# Patient Record
Sex: FEMALE | Race: WHITE | NOT HISPANIC OR LATINO | Employment: FULL TIME | URBAN - METROPOLITAN AREA
[De-identification: names, ages, dates, MRNs, and addresses within clinical notes are randomized per-mention and may not be internally consistent; named-entity substitution may affect disease eponyms.]

---

## 2024-11-13 ENCOUNTER — PATIENT MESSAGE (OUTPATIENT)
Dept: OTHER | Facility: OTHER | Age: 39
End: 2024-11-13
Payer: MEDICAID

## 2024-11-19 ENCOUNTER — ROUTINE PRENATAL (OUTPATIENT)
Dept: OBSTETRICS AND GYNECOLOGY | Facility: CLINIC | Age: 39
End: 2024-11-19
Payer: MEDICAID

## 2024-11-19 VITALS
SYSTOLIC BLOOD PRESSURE: 114 MMHG | WEIGHT: 147.06 LBS | BODY MASS INDEX: 25.24 KG/M2 | DIASTOLIC BLOOD PRESSURE: 72 MMHG

## 2024-11-19 DIAGNOSIS — Z3A.17 17 WEEKS GESTATION OF PREGNANCY: ICD-10-CM

## 2024-11-19 DIAGNOSIS — Z34.81 ENCOUNTER FOR SUPERVISION OF OTHER NORMAL PREGNANCY IN FIRST TRIMESTER: Primary | ICD-10-CM

## 2024-11-19 LAB
BILIRUBIN, UA POC OHS: NEGATIVE
BLOOD, UA POC OHS: NEGATIVE
CLARITY, UA POC OHS: CLEAR
COLOR, UA POC OHS: YELLOW
GLUCOSE, UA POC OHS: NEGATIVE
KETONES, UA POC OHS: NEGATIVE
LEUKOCYTES, UA POC OHS: NEGATIVE
NITRITE, UA POC OHS: NEGATIVE
PH, UA POC OHS: 7
PROTEIN, UA POC OHS: NEGATIVE
SPECIFIC GRAVITY, UA POC OHS: 1.01
UROBILINOGEN, UA POC OHS: 0.2

## 2024-11-19 PROCEDURE — 87661 TRICHOMONAS VAGINALIS AMPLIF: CPT | Performed by: OBSTETRICS & GYNECOLOGY

## 2024-11-19 PROCEDURE — 87591 N.GONORRHOEAE DNA AMP PROB: CPT | Performed by: OBSTETRICS & GYNECOLOGY

## 2024-11-19 PROCEDURE — 99999 PR PBB SHADOW E&M-EST. PATIENT-LVL II: CPT | Mod: PBBFAC,,, | Performed by: OBSTETRICS & GYNECOLOGY

## 2024-11-19 PROCEDURE — 99999PBSHW POCT URINALYSIS(INSTRUMENT): Mod: PBBFAC,,,

## 2024-11-19 PROCEDURE — 81003 URINALYSIS AUTO W/O SCOPE: CPT | Mod: PBBFAC,PN | Performed by: OBSTETRICS & GYNECOLOGY

## 2024-11-19 PROCEDURE — 99212 OFFICE O/P EST SF 10 MIN: CPT | Mod: PBBFAC,TH,PN | Performed by: OBSTETRICS & GYNECOLOGY

## 2024-11-19 PROCEDURE — 99213 OFFICE O/P EST LOW 20 MIN: CPT | Mod: TH,S$PBB,, | Performed by: OBSTETRICS & GYNECOLOGY
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
## 2024-11-20 LAB
C TRACH DNA SPEC QL NAA+PROBE: NOT DETECTED
N GONORRHOEA DNA SPEC QL NAA+PROBE: NOT DETECTED

## 2024-11-21 LAB
SPECIMEN SOURCE: NORMAL
T VAGINALIS RRNA SPEC QL NAA+PROBE: NEGATIVE

## 2024-11-23 ENCOUNTER — OFFICE VISIT (OUTPATIENT)
Dept: URGENT CARE | Facility: CLINIC | Age: 39
End: 2024-11-23
Payer: MEDICAID

## 2024-11-23 VITALS
BODY MASS INDEX: 24.75 KG/M2 | HEART RATE: 88 BPM | TEMPERATURE: 98 F | OXYGEN SATURATION: 97 % | DIASTOLIC BLOOD PRESSURE: 80 MMHG | WEIGHT: 145 LBS | SYSTOLIC BLOOD PRESSURE: 123 MMHG | HEIGHT: 64 IN

## 2024-11-23 DIAGNOSIS — L50.9 URTICARIA: ICD-10-CM

## 2024-11-23 DIAGNOSIS — L25.9 CONTACT DERMATITIS AND ECZEMA: Primary | ICD-10-CM

## 2024-11-23 DIAGNOSIS — B37.9 CANDIDA ALBICANS INFECTION: ICD-10-CM

## 2024-11-23 PROCEDURE — 99213 OFFICE O/P EST LOW 20 MIN: CPT | Mod: S$GLB,,, | Performed by: PHYSICIAN ASSISTANT

## 2024-11-23 RX ORDER — CLOTRIMAZOLE 1 %
CREAM (GRAM) TOPICAL 2 TIMES DAILY
Qty: 28 G | Refills: 0 | Status: SHIPPED | OUTPATIENT
Start: 2024-11-23

## 2024-11-23 RX ORDER — DIPHENHYDRAMINE HCL 25 MG
25 CAPSULE ORAL
Status: COMPLETED | OUTPATIENT
Start: 2024-11-23 | End: 2024-11-23

## 2024-11-23 RX ORDER — HYDROXYZINE HYDROCHLORIDE 25 MG/1
25 TABLET, FILM COATED ORAL 3 TIMES DAILY
Qty: 15 TABLET | Refills: 0 | Status: SHIPPED | OUTPATIENT
Start: 2024-11-23 | End: 2024-11-28

## 2024-11-23 RX ORDER — FAMOTIDINE 20 MG/1
20 TABLET, FILM COATED ORAL
Status: COMPLETED | OUTPATIENT
Start: 2024-11-23 | End: 2024-11-23

## 2024-11-23 RX ORDER — FAMOTIDINE 20 MG/1
20 TABLET, FILM COATED ORAL 2 TIMES DAILY
Qty: 20 TABLET | Refills: 0 | Status: SHIPPED | OUTPATIENT
Start: 2024-11-23 | End: 2024-12-03

## 2024-11-23 RX ORDER — DIPHENHYDRAMINE HCL 25 MG
25 CAPSULE ORAL EVERY 6 HOURS PRN
Qty: 20 CAPSULE | Refills: 0 | Status: SHIPPED | OUTPATIENT
Start: 2024-11-23 | End: 2024-11-23

## 2024-11-23 RX ADMIN — FAMOTIDINE 20 MG: 20 TABLET, FILM COATED ORAL at 12:11

## 2024-11-23 RX ADMIN — Medication 25 MG: at 12:11

## 2024-11-23 NOTE — PROGRESS NOTES
"Subjective:      Patient ID: Silvia Berry is a 39 y.o. female.    Vitals:  height is 5' 4" (1.626 m) and weight is 65.8 kg (145 lb). Her oral temperature is 98.2 °F (36.8 °C). Her blood pressure is 123/80 and her pulse is 88. Her oxygen saturation is 97%.     Chief Complaint: Urticaria    Pt c/o hives on sides, back of thighs and buttocks occurred 2 days ago. Treatments tried oatmeal baths, calamine lotion with temporary relief. Pain 9/10.    Urticaria  This is a new problem. The current episode started in the past 7 days. The affected locations include the left buttock, right buttock, left upper leg, right upper leg, left arm, right arm and back. The rash is characterized by redness, pain, itchiness, burning and swelling. Pertinent negatives include no congestion, cough, diarrhea, eye pain, fatigue, fever, shortness of breath, sore throat or vomiting. Past treatments include antihistamine, anti-itch cream, cold compress and moisturizer. The treatment provided no relief.       Constitution: Negative for chills, sweating, fatigue and fever.   HENT:  Negative for ear pain, drooling, congestion, sore throat, trouble swallowing and voice change.    Neck: Negative for neck pain, neck stiffness, painful lymph nodes and neck swelling.   Cardiovascular:  Negative for chest pain, leg swelling, palpitations, sob on exertion and passing out.   Eyes:  Negative for eye pain, eye redness, photophobia, double vision, blurred vision and eyelid swelling.   Respiratory:  Negative for chest tightness, cough, sputum production, bloody sputum, shortness of breath, stridor and wheezing.    Gastrointestinal:  Negative for abdominal pain, abdominal bloating, nausea, vomiting, constipation, diarrhea and heartburn.   Musculoskeletal:  Negative for joint pain, joint swelling, abnormal ROM of joint, back pain, muscle cramps and muscle ache.   Skin:  Positive for hives. Negative for rash.   Allergic/Immunologic: Positive for hives and " itching. Negative for seasonal allergies, food allergies and sneezing.   Neurological:  Negative for dizziness, light-headedness, passing out, loss of balance, headaches, altered mental status, loss of consciousness and seizures.   Hematologic/Lymphatic: Negative for swollen lymph nodes.   Psychiatric/Behavioral:  Negative for altered mental status and nervous/anxious. The patient is not nervous/anxious.       Objective:     Physical Exam   Constitutional: She is oriented to person, place, and time. She appears well-developed. She is cooperative.   HENT:   Head: Normocephalic and atraumatic.   Ears:   Right Ear: Hearing, tympanic membrane, external ear and ear canal normal.   Left Ear: Hearing, tympanic membrane, external ear and ear canal normal.   Nose: Nose normal. No mucosal edema or nasal deformity. No epistaxis. Right sinus exhibits no maxillary sinus tenderness and no frontal sinus tenderness. Left sinus exhibits no maxillary sinus tenderness and no frontal sinus tenderness.   Mouth/Throat: Uvula is midline, oropharynx is clear and moist and mucous membranes are normal. No trismus in the jaw. Normal dentition. No uvula swelling.   Eyes: Conjunctivae and lids are normal.   Neck: Trachea normal and phonation normal. Neck supple.   Cardiovascular: Normal rate, regular rhythm, normal heart sounds and normal pulses.   Pulmonary/Chest: Effort normal and breath sounds normal.   Abdominal: Normal appearance and bowel sounds are normal. Soft.   Musculoskeletal: Normal range of motion.         General: Normal range of motion.   Neurological: She is alert and oriented to person, place, and time. She exhibits normal muscle tone.   Skin: Skin is warm, dry, intact and rash.         Comments: +papular rash to bl upper extremities. Torso, abdomen.   Psychiatric: Her speech is normal and behavior is normal. Judgment and thought content normal.   Nursing note and vitals reviewed.      Assessment:     1. Urticaria        Plan:    Pt is pregnant. Has  to take home. Discussed possibilities for urticarial and contact dermatitis rash. Some areas under breasts resemle yeast formation. Pt is 18 weeks pregnant    Urticaria    Other orders  -     famotidine tablet 20 mg  -     diphenhydrAMINE capsule 25 mg  -     famotidine (PEPCID) 20 MG tablet; Take 1 tablet (20 mg total) by mouth 2 (two) times daily. for 10 days  Dispense: 20 tablet; Refill: 0  -     diphenhydrAMINE (BENADRYL) 25 mg capsule; Take 1 capsule (25 mg total) by mouth every 6 (six) hours as needed for Itching.  Dispense: 20 capsule; Refill: 0    INSTRUCTIONS:  - Rest.  - Drink plenty of fluids.  - Take Tylenol as directed as needed for fever/pain.  Do not take more than the recommended dose.  - follow up with your PCP within the next 1-2 weeks as needed.  - You must understand that you have received an Urgent Care treatment only and that you may be released before all of your medical problems are known or treated.   - You, the patient, will arrange for follow up care as instructed.   - If your condition worsens or fails to improve we recommend that you receive another evaluation at the ER immediately or contact your PCP to discuss your concerns.   - You can call [phone] or [phone] to help schedule an appointment with the appropriate provider.     -If you smoke cigarettes, it would be beneficial for you to stop.

## 2024-11-26 ENCOUNTER — TELEPHONE (OUTPATIENT)
Dept: OBSTETRICS AND GYNECOLOGY | Facility: CLINIC | Age: 39
End: 2024-11-26
Payer: MEDICAID

## 2024-11-26 NOTE — TELEPHONE ENCOUNTER
----- Message from Leo sent at 11/26/2024  8:48 AM CST -----  Type:  Needs Medical Advice    Who Called: pt  Symptoms (please be specific): Symptom: Hives  Outcome: Schedule a same-day appointment or talk to a nurse or provider within 1 hour.  Reason: Caller denied all higher acuity questions  How long has patient had these symptoms:    Pharmacy name and phone #:  [facility] #[id number] [facility] [id number] AT [facility] [address]  Would the patient rather a call back or a response via [facility]? call  Best Call Back Number:   [phone]  Additional Information: pt states she would like a call back from office asap to discuss getting an appt/ advice. Pt states she went to urgent care and they prescribed cream but they are not working. Thank you  
Detail Level: Detailed

## 2024-12-04 ENCOUNTER — PATIENT MESSAGE (OUTPATIENT)
Dept: OTHER | Facility: OTHER | Age: 39
End: 2024-12-04
Payer: MEDICAID

## 2024-12-17 ENCOUNTER — ROUTINE PRENATAL (OUTPATIENT)
Dept: OBSTETRICS AND GYNECOLOGY | Facility: CLINIC | Age: 39
End: 2024-12-17
Payer: MEDICAID

## 2024-12-17 ENCOUNTER — HOSPITAL ENCOUNTER (OUTPATIENT)
Dept: RADIOLOGY | Facility: HOSPITAL | Age: 39
Discharge: HOME | End: 2024-12-17
Attending: OBSTETRICS & GYNECOLOGY
Payer: MEDICAID

## 2024-12-17 VITALS
DIASTOLIC BLOOD PRESSURE: 64 MMHG | BODY MASS INDEX: 26.45 KG/M2 | WEIGHT: 154.13 LBS | SYSTOLIC BLOOD PRESSURE: 116 MMHG

## 2024-12-17 DIAGNOSIS — Z34.81 ENCOUNTER FOR SUPERVISION OF OTHER NORMAL PREGNANCY IN FIRST TRIMESTER: Primary | ICD-10-CM

## 2024-12-17 DIAGNOSIS — Z34.81 ENCOUNTER FOR SUPERVISION OF OTHER NORMAL PREGNANCY IN FIRST TRIMESTER: ICD-10-CM

## 2024-12-17 LAB
BILIRUBIN, UA POC OHS: NEGATIVE
BLOOD, UA POC OHS: NEGATIVE
CLARITY, UA POC OHS: CLEAR
COLOR, UA POC OHS: YELLOW
GLUCOSE, UA POC OHS: NEGATIVE
KETONES, UA POC OHS: NEGATIVE
LEUKOCYTES, UA POC OHS: NEGATIVE
NITRITE, UA POC OHS: NEGATIVE
PH, UA POC OHS: 7.5
PROTEIN, UA POC OHS: NEGATIVE
SPECIFIC GRAVITY, UA POC OHS: 1.01
UROBILINOGEN, UA POC OHS: 0.2

## 2024-12-17 PROCEDURE — 76805 OB US >/= 14 WKS SNGL FETUS: CPT | Mod: TC,PN

## 2024-12-17 PROCEDURE — 99212 OFFICE O/P EST SF 10 MIN: CPT | Mod: PBBFAC,25,TH,PN | Performed by: OBSTETRICS & GYNECOLOGY

## 2024-12-17 PROCEDURE — 99999PBSHW POCT URINALYSIS(INSTRUMENT): Mod: PBBFAC,,,

## 2024-12-17 PROCEDURE — 81003 URINALYSIS AUTO W/O SCOPE: CPT | Mod: PBBFAC,PN | Performed by: OBSTETRICS & GYNECOLOGY

## 2024-12-17 PROCEDURE — 76805 OB US >/= 14 WKS SNGL FETUS: CPT | Mod: 26,,, | Performed by: RADIOLOGY

## 2024-12-17 PROCEDURE — 99999 PR PBB SHADOW E&M-EST. PATIENT-LVL II: CPT | Mod: PBBFAC,,, | Performed by: OBSTETRICS & GYNECOLOGY

## 2024-12-17 PROCEDURE — 99213 OFFICE O/P EST LOW 20 MIN: CPT | Mod: TH,S$PBB,, | Performed by: OBSTETRICS & GYNECOLOGY

## 2024-12-17 NOTE — PROGRESS NOTES
Subjective:      Silvia Berry is a 39 y.o. female being seen today for her obstetrical visit. She is at 21w6d gestation. Patient reports no complaints.     OB ROS: no vaginal bleeding, no leakage of fluid, no contractions/cramping, no vaginal discharge. Fetal movement: normal.    Menstrual History:  OB History          2    Para   1    Term   1       0    AB   0    Living   1         SAB   0    IAB   0    Ectopic   0    Multiple   0    Live Births   1                Patient's last menstrual period was 2024 (exact date).       Objective:      /64   Wt 69.9 kg (154 lb 1.6 oz)   LMP 2024 (Exact Date)   BMI 26.45 kg/m²     Vitals  BP: 116/64  Weight: 69.9 kg (154 lb 1.6 oz)  Prenatal  Fetal Heart Rate: 140 US  Movement: Present  Fundal height not measured  6.7 kg (14 lb 12.3 oz)           Prenatal Labs:  Lab Results   Component Value Date    GROUPTRH A NEG 2024    INDCOGEL POS 2021    HGB 14.3 2024    HCT 42.7 2024     2024    RUBELLAIMMUN Reactive 2024    HEPBSAG Non-reactive 2024    [id number] Non-reactive 2024    RPR Non-reactive 2021    LABCHLA Not Detected 2024    LABNGO Not Detected 2024    LABURIN No significant growth 2024    [id number] Negative 2021       Assessment:      Pregnancy 21w6d      Plan:     EDC 2025 by 9wk ultrasound  Vaccines: declined flu, Tdap, & RSV  BMI: 23, recommenced 25-35# gain.   Nausea and vomiting: Rx sent, improved  Cdx1; interested in trial of labor after  delivery  Desires Natural, recommended Lamaze classes &   Genetic: AMA: NIPT low risk, female, aspirin   Pap: 2023 NILM/HPVneg  Rh Neg: [place] Rh testing; RhD not detected, RhoGam not indicated.   Peds: [facility]  Natural  Breastfeeding, needs pump  Contraception: declines        1. Encounter for supervision of other normal pregnancy in first trimester  -     POCT  Urinalysis(Instrument)  -     OB Glucose Screen; Future; Expected date: 12/17/2024         Follow up in 4 weeks.       I reviewed today her blood pressure, weight gain, urine results and  fetal heart rate.  I have reviewed the previous labs and ultrasound with the patient.   I have answered questions to her satisfaction and total of 20 minutes spend today

## 2025-01-01 ENCOUNTER — PATIENT MESSAGE (OUTPATIENT)
Dept: OTHER | Facility: OTHER | Age: 40
End: 2025-01-01
Payer: MEDICAID

## 2025-01-14 ENCOUNTER — ROUTINE PRENATAL (OUTPATIENT)
Dept: OBSTETRICS AND GYNECOLOGY | Facility: CLINIC | Age: 40
End: 2025-01-14
Payer: MEDICAID

## 2025-01-14 ENCOUNTER — LAB VISIT (OUTPATIENT)
Dept: LAB | Facility: HOSPITAL | Age: 40
End: 2025-01-14
Attending: OBSTETRICS & GYNECOLOGY
Payer: MEDICAID

## 2025-01-14 VITALS
DIASTOLIC BLOOD PRESSURE: 60 MMHG | BODY MASS INDEX: 27.97 KG/M2 | SYSTOLIC BLOOD PRESSURE: 118 MMHG | WEIGHT: 162.94 LBS

## 2025-01-14 DIAGNOSIS — Z34.81 ENCOUNTER FOR SUPERVISION OF OTHER NORMAL PREGNANCY IN FIRST TRIMESTER: Primary | ICD-10-CM

## 2025-01-14 DIAGNOSIS — Z34.81 ENCOUNTER FOR SUPERVISION OF OTHER NORMAL PREGNANCY IN FIRST TRIMESTER: ICD-10-CM

## 2025-01-14 LAB
BILIRUBIN, UA POC OHS: NEGATIVE
BLOOD, UA POC OHS: NEGATIVE
CLARITY, UA POC OHS: CLEAR
COLOR, UA POC OHS: YELLOW
GLUCOSE SERPL-MCNC: 120 MG/DL (ref 70–140)
GLUCOSE, UA POC OHS: NEGATIVE
KETONES, UA POC OHS: NEGATIVE
LEUKOCYTES, UA POC OHS: NEGATIVE
NITRITE, UA POC OHS: NEGATIVE
PH, UA POC OHS: 7.5
PROTEIN, UA POC OHS: NEGATIVE
SPECIFIC GRAVITY, UA POC OHS: 1.01
UROBILINOGEN, UA POC OHS: 0.2

## 2025-01-14 PROCEDURE — 82950 GLUCOSE TEST: CPT | Performed by: OBSTETRICS & GYNECOLOGY

## 2025-01-14 PROCEDURE — 36415 COLL VENOUS BLD VENIPUNCTURE: CPT | Mod: PN | Performed by: OBSTETRICS & GYNECOLOGY

## 2025-01-14 PROCEDURE — 99999 PR PBB SHADOW E&M-EST. PATIENT-LVL II: CPT | Mod: PBBFAC,,, | Performed by: OBSTETRICS & GYNECOLOGY

## 2025-01-14 PROCEDURE — 99212 OFFICE O/P EST SF 10 MIN: CPT | Mod: PBBFAC,TH,PN | Performed by: OBSTETRICS & GYNECOLOGY

## 2025-01-14 PROCEDURE — 81003 URINALYSIS AUTO W/O SCOPE: CPT | Mod: PBBFAC,PN | Performed by: OBSTETRICS & GYNECOLOGY

## 2025-01-14 PROCEDURE — 99213 OFFICE O/P EST LOW 20 MIN: CPT | Mod: TH,S$PBB,, | Performed by: OBSTETRICS & GYNECOLOGY

## 2025-01-14 PROCEDURE — 99999PBSHW POCT URINALYSIS(INSTRUMENT): Mod: PBBFAC,,,

## 2025-01-14 NOTE — PROGRESS NOTES
Subjective:      Silvia Berry is a 39 y.o. female being seen today for her obstetrical visit. She is at 25w6d gestation. Patient reports no complaints.     OB ROS: no vaginal bleeding, no leakage of fluid, no contractions/cramping, no vaginal discharge. Fetal movement: normal.    Menstrual History:  OB History          2    Para   1    Term   1       0    AB   0    Living   1         SAB   0    IAB   0    Ectopic   0    Multiple   0    Live Births   1                Patient's last menstrual period was 2024 (exact date).       Objective:      /60   Wt 73.9 kg (162 lb 14.7 oz)   LMP 2024 (Exact Date)   BMI 27.97 kg/m²     Vitals  BP: 118/60  Weight: 73.9 kg (162 lb 14.7 oz)  Prenatal  Fetal Heart Rate: 145  Movement: Present  Fundal height not measured  10.7 kg (23 lb 9.4 oz)           Prenatal Labs:  Lab Results   Component Value Date    GROUPTRH A NEG 2024    INDCOGEL POS 2021    HGB 14.3 2024    HCT 42.7 2024     2024    RUBELLAIMMUN Reactive 2024    HEPBSAG Non-reactive 2024    [id number] Non-reactive 2024    RPR Non-reactive 2021    LABCHLA Not Detected 2024    LABNGO Not Detected 2024    LABURIN No significant growth 2024    [id number] Negative 2021       Assessment:      Pregnancy 25w6d      Plan:     EDC 2025 by 9wk ultrasound  Vaccines: declined flu, Tdap, & RSV  BMI: 23, recommenced 25-35# gain.   Nausea and vomiting: Rx sent, improved  Cdx1; interested in trial of labor after  delivery. Aware we cannot promise a .   Desires Natural, recommended Lamaze classes &   Genetic: AMA: NIPT low risk, female, aspirin   Pap: 2023 NILM/HPVneg  Rh Neg: [place] Rh testing; RhD not detected, RhoGam not indicated.   Peds: [facility]  Natural  Breastfeeding, needs pump  Contraception: declines        1. Encounter for supervision of other normal pregnancy in first  trimester  -     POCT Urinalysis(Instrument)         Follow up in 4 weeks.       I reviewed today her blood pressure, weight gain, urine results and  fetal heart rate.  I have reviewed the previous labs and ultrasound with the patient.   I have answered questions to her satisfaction and total of 20 minutes spend today

## 2025-01-15 ENCOUNTER — PATIENT MESSAGE (OUTPATIENT)
Dept: OTHER | Facility: OTHER | Age: 40
End: 2025-01-15
Payer: MEDICAID

## 2025-01-16 ENCOUNTER — PATIENT MESSAGE (OUTPATIENT)
Dept: OBSTETRICS AND GYNECOLOGY | Facility: CLINIC | Age: 40
End: 2025-01-16
Payer: MEDICAID

## 2025-01-29 ENCOUNTER — PATIENT MESSAGE (OUTPATIENT)
Dept: OTHER | Facility: OTHER | Age: 40
End: 2025-01-29
Payer: MEDICAID

## 2025-02-12 ENCOUNTER — PATIENT MESSAGE (OUTPATIENT)
Dept: OTHER | Facility: OTHER | Age: 40
End: 2025-02-12
Payer: MEDICAID

## 2025-02-19 ENCOUNTER — LAB VISIT (OUTPATIENT)
Dept: LAB | Facility: HOSPITAL | Age: 40
End: 2025-02-19
Attending: OBSTETRICS & GYNECOLOGY
Payer: MEDICAID

## 2025-02-19 ENCOUNTER — RESULTS FOLLOW-UP (OUTPATIENT)
Dept: OBSTETRICS AND GYNECOLOGY | Facility: CLINIC | Age: 40
End: 2025-02-19

## 2025-02-19 ENCOUNTER — ROUTINE PRENATAL (OUTPATIENT)
Dept: OBSTETRICS AND GYNECOLOGY | Facility: CLINIC | Age: 40
End: 2025-02-19
Payer: MEDICAID

## 2025-02-19 VITALS
BODY MASS INDEX: 28.34 KG/M2 | SYSTOLIC BLOOD PRESSURE: 116 MMHG | WEIGHT: 165.13 LBS | DIASTOLIC BLOOD PRESSURE: 78 MMHG

## 2025-02-19 DIAGNOSIS — O34.211 MATERNAL CARE DUE TO LOW TRANSVERSE UTERINE SCAR FROM PREVIOUS CESAREAN DELIVERY: ICD-10-CM

## 2025-02-19 DIAGNOSIS — O09.529 ANTEPARTUM MULTIGRAVIDA OF ADVANCED MATERNAL AGE: ICD-10-CM

## 2025-02-19 DIAGNOSIS — Z34.81 ENCOUNTER FOR SUPERVISION OF OTHER NORMAL PREGNANCY IN FIRST TRIMESTER: Primary | ICD-10-CM

## 2025-02-19 DIAGNOSIS — Z34.81 ENCOUNTER FOR SUPERVISION OF OTHER NORMAL PREGNANCY IN FIRST TRIMESTER: ICD-10-CM

## 2025-02-19 LAB
BASOPHILS # BLD AUTO: 0.04 K/UL (ref 0–0.2)
BASOPHILS NFR BLD: 0.4 % (ref 0–1.9)
BILIRUBIN, UA POC OHS: NEGATIVE
BLOOD, UA POC OHS: NEGATIVE
CLARITY, UA POC OHS: CLEAR
COLOR, UA POC OHS: YELLOW
DIFFERENTIAL METHOD BLD: ABNORMAL
EOSINOPHIL # BLD AUTO: 0.2 K/UL (ref 0–0.5)
EOSINOPHIL NFR BLD: 1.8 % (ref 0–8)
ERYTHROCYTE [DISTWIDTH] IN BLOOD BY AUTOMATED COUNT: 13.8 % (ref 11.5–14.5)
GLUCOSE, UA POC OHS: NEGATIVE
HCT VFR BLD AUTO: 37.3 % (ref 37–48.5)
HGB BLD-MCNC: 11.8 G/DL (ref 12–16)
IMM GRANULOCYTES # BLD AUTO: 0.04 K/UL (ref 0–0.04)
IMM GRANULOCYTES NFR BLD AUTO: 0.4 % (ref 0–0.5)
KETONES, UA POC OHS: NEGATIVE
LEUKOCYTES, UA POC OHS: NEGATIVE
LYMPHOCYTES # BLD AUTO: 2 K/UL (ref 1–4.8)
LYMPHOCYTES NFR BLD: 22.6 % (ref 18–48)
MCH RBC QN AUTO: 30.5 PG (ref 27–31)
MCHC RBC AUTO-ENTMCNC: 31.6 G/DL (ref 32–36)
MCV RBC AUTO: 96 FL (ref 82–98)
MONOCYTES # BLD AUTO: 0.8 K/UL (ref 0.3–1)
MONOCYTES NFR BLD: 8.5 % (ref 4–15)
NEUTROPHILS # BLD AUTO: 6 K/UL (ref 1.8–7.7)
NEUTROPHILS NFR BLD: 66.3 % (ref 38–73)
NITRITE, UA POC OHS: NEGATIVE
NRBC BLD-RTO: 0 /100 WBC
PH, UA POC OHS: 7
PLATELET # BLD AUTO: 312 K/UL (ref 150–450)
PMV BLD AUTO: 11.3 FL (ref 9.2–12.9)
PROTEIN, UA POC OHS: NEGATIVE
RBC # BLD AUTO: 3.87 M/UL (ref 4–5.4)
SPECIFIC GRAVITY, UA POC OHS: 1.02
UROBILINOGEN, UA POC OHS: 0.2
WBC # BLD AUTO: 9.02 K/UL (ref 3.9–12.7)

## 2025-02-19 PROCEDURE — 36415 COLL VENOUS BLD VENIPUNCTURE: CPT | Mod: PO | Performed by: OBSTETRICS & GYNECOLOGY

## 2025-02-19 PROCEDURE — 81003 URINALYSIS AUTO W/O SCOPE: CPT | Mod: PBBFAC,PN | Performed by: OBSTETRICS & GYNECOLOGY

## 2025-02-19 PROCEDURE — 85025 COMPLETE CBC W/AUTO DIFF WBC: CPT | Performed by: OBSTETRICS & GYNECOLOGY

## 2025-02-19 PROCEDURE — 99212 OFFICE O/P EST SF 10 MIN: CPT | Mod: PBBFAC,TH,PN | Performed by: OBSTETRICS & GYNECOLOGY

## 2025-02-19 NOTE — PROGRESS NOTES
Subjective:      Silvia Berry is a 39 y.o. female being seen today for her obstetrical visit. She is at 31w0d gestation. Patient reports no complaints.     OB ROS: no vaginal bleeding, no leakage of fluid, no contractions/cramping, no vaginal discharge. Fetal movement: normal.    Menstrual History:  OB History          2    Para   1    Term   1       0    AB   0    Living   1         SAB   0    IAB   0    Ectopic   0    Multiple   0    Live Births   1                Patient's last menstrual period was 2024 (exact date).       Objective:      /78   Wt 74.9 kg (165 lb 2 oz)   LMP 2024 (Exact Date)   BMI 28.34 kg/m²     Vitals  BP: 116/78  Weight: 74.9 kg (165 lb 2 oz)  Prenatal  Fetal Heart Rate: 148  Movement: Present  Fundal height size equal to dates  11.7 kg (25 lb 12.7 oz)    Prenatal Labs:  Lab Results   Component Value Date    GROUPTRH A NEG 2024    INDCOGEL POS 2021    HGB 14.3 2024    HCT 42.7 2024     2024    RUBELLAIMMUN Reactive 2024    HEPBSAG Non-reactive 2024    [id number] Non-reactive 2024    RPR Non-reactive 2021    LABCHLA Not Detected 2024    LABNGO Not Detected 2024    LABURIN No significant growth 2024    OBGLUCOSESCR 120 2025    [id number] Negative 2021       Assessment:      Pregnancy 31w0d      Plan:     EDC 2025 by 9wk ultrasound  Vaccines: declined flu, Tdap, & RSV  BMI: 23, recommenced 25-35# gain.   Nausea and vomiting: Rx sent, improved  Cdx1; interested in trial of labor after  delivery. Aware we cannot promise a .   Desires Natural, recommended Lamaze classes &   Genetic: AMA: NIPT low risk, female, aspirin   Pap: 2023 NILM/HPVneg  Rh Neg: [place] Rh testing; RhD not detected, RhoGam not indicated.   Peds: [facility]  Natural  Breastfeeding, needs pump  Contraception: declines        1. Encounter for supervision of other normal  pregnancy in first trimester  -     CBC Auto Differential; Future; Expected date: 2025    2. Antepartum multigravida of advanced maternal age    3. Maternal care due to low transverse uterine scar from previous  delivery         Follow up in 2 weeks.       I reviewed today her blood pressure, weight gain, urine results and  fetal heart rate.  I have reviewed the previous labs and ultrasound with the patient.   I have answered questions to her satisfaction and total of 20 minutes spend today

## 2025-02-19 NOTE — Clinical Note
Please make all her OB FU visits until delivery. She will need a visit every two weeks until 36 weeks. Then a visit weekly until 40 weeks. She will need a growth US at 36 weeks.   Mornings, after 9AM. 9-10 better. Any day.

## 2025-02-26 ENCOUNTER — PATIENT MESSAGE (OUTPATIENT)
Dept: OTHER | Facility: OTHER | Age: 40
End: 2025-02-26
Payer: MEDICAID

## 2025-03-11 ENCOUNTER — ROUTINE PRENATAL (OUTPATIENT)
Dept: OBSTETRICS AND GYNECOLOGY | Facility: CLINIC | Age: 40
End: 2025-03-11
Payer: MEDICAID

## 2025-03-11 ENCOUNTER — RESULTS FOLLOW-UP (OUTPATIENT)
Dept: OBSTETRICS AND GYNECOLOGY | Facility: CLINIC | Age: 40
End: 2025-03-11

## 2025-03-11 VITALS
DIASTOLIC BLOOD PRESSURE: 70 MMHG | WEIGHT: 168.88 LBS | SYSTOLIC BLOOD PRESSURE: 110 MMHG | BODY MASS INDEX: 28.99 KG/M2

## 2025-03-11 DIAGNOSIS — O09.529 ANTEPARTUM MULTIGRAVIDA OF ADVANCED MATERNAL AGE: Primary | ICD-10-CM

## 2025-03-11 DIAGNOSIS — K21.9 GASTROESOPHAGEAL REFLUX DISEASE, UNSPECIFIED WHETHER ESOPHAGITIS PRESENT: ICD-10-CM

## 2025-03-11 LAB
BILIRUBIN, UA POC OHS: NEGATIVE
BLOOD, UA POC OHS: NEGATIVE
CLARITY, UA POC OHS: CLEAR
COLOR, UA POC OHS: YELLOW
GLUCOSE, UA POC OHS: NEGATIVE
KETONES, UA POC OHS: NEGATIVE
LEUKOCYTES, UA POC OHS: NEGATIVE
NITRITE, UA POC OHS: NEGATIVE
PH, UA POC OHS: 6.5
PROTEIN, UA POC OHS: ABNORMAL
SPECIFIC GRAVITY, UA POC OHS: 1.02
UROBILINOGEN, UA POC OHS: 0.2

## 2025-03-11 PROCEDURE — 81003 URINALYSIS AUTO W/O SCOPE: CPT | Mod: PBBFAC,PN | Performed by: OBSTETRICS & GYNECOLOGY

## 2025-03-11 PROCEDURE — 99999PBSHW POCT URINALYSIS(INSTRUMENT): Mod: PBBFAC,,,

## 2025-03-11 PROCEDURE — 99214 OFFICE O/P EST MOD 30 MIN: CPT | Mod: TH,S$PBB,, | Performed by: OBSTETRICS & GYNECOLOGY

## 2025-03-11 PROCEDURE — 99999 PR PBB SHADOW E&M-EST. PATIENT-LVL III: CPT | Mod: PBBFAC,,, | Performed by: OBSTETRICS & GYNECOLOGY

## 2025-03-11 PROCEDURE — 99213 OFFICE O/P EST LOW 20 MIN: CPT | Mod: PBBFAC,TH,PN | Performed by: OBSTETRICS & GYNECOLOGY

## 2025-03-11 RX ORDER — OMEPRAZOLE 20 MG/1
20 CAPSULE, DELAYED RELEASE ORAL DAILY
Qty: 30 CAPSULE | Refills: 11 | Status: SHIPPED | OUTPATIENT
Start: 2025-03-11 | End: 2026-03-11

## 2025-03-11 NOTE — PROGRESS NOTES
Subjective:      Silvia Berry is a 39 y.o. female being seen today for her obstetrical visit. She is at 33w6d gestation. Patient reports no complaints.     OB ROS: no vaginal bleeding, no leakage of fluid, no contractions/cramping, no vaginal discharge. Fetal movement: normal.    Menstrual History:  OB History          2    Para   1    Term   1       0    AB   0    Living   1         SAB   0    IAB   0    Ectopic   0    Multiple   0    Live Births   1                Patient's last menstrual period was 2024 (exact date).       Objective:      /70   Wt 76.6 kg (168 lb 14 oz)   LMP 2024 (Exact Date)   BMI 28.99 kg/m²     Vitals  BP: 110/70  Weight: 76.6 kg (168 lb 14 oz)  Prenatal  Fundal Height (cm): 36 cm  Fetal Heart Rate: 142  Movement: Present  Fundal height size equal to dates  13.4 kg (29 lb 8.7 oz)           Prenatal Labs:  Lab Results   Component Value Date    GROUPTRH A NEG 2024    INDCOGEL POS 2021    HGB 11.8 (L) 2025    HCT 37.3 2025     2025    RUBELLAIMMUN Reactive 2024    HEPBSAG Non-reactive 2024    [id number] Non-reactive 2024    RPR Non-reactive 2021    LABCHLA Not Detected 2024    LABNGO Not Detected 2024    LABURIN No significant growth 2024    OBGLUCOSESCR 120 2025    [id number] Negative 2021       Assessment:      Pregnancy 33w6d      Plan:     EDC 2025 by 9wk ultrasound  Vaccines: declined flu, Tdap, & RSV  BMI: 23, recommenced 25-35# gain    Cdx1; interested in trial of labor after  delivery. Aware we cannot promise a .   Discussed risks of uterine rupture, patient aware. Discussed repeat  delivery at 39 weeks, patient declined.  Desires Natural, recommended Lamaze classes &   Dicussed risk of need for emergent  delivery under general anesthesia, patient aware.   Discussed indication for pitocin in labor vs postpartum to  prevent postpartum hemorrhage, patient aware.  Natural, Delayed cord clamping  Discussed placenta policy    Genetic: AMA: NIPT low risk, female, aspirin   Pap: 12/2023 NILM/HPVneg  Rh Neg: [place] Rh testing; RhD not detected, RhoGam not indicated.   Peds: [facility]  Breastfeeding, has pump  Contraception: declines          1. Antepartum multigravida of advanced maternal age  -     POCT Urinalysis(Instrument)  -     US OB Limited 1 Or More Gestations; Future; Expected date: 03/25/2025    2. Gastroesophageal reflux disease, unspecified whether esophagitis present  -     omeprazole (PRILOSEC) 20 MG capsule; Take 1 capsule (20 mg total) by mouth once daily.  Dispense: 30 capsule; Refill: 11       Follow up in 2 weeks.     I reviewed today her blood pressure, weight gain, urine results and  fetal heart rate.  I have reviewed the previous labs and ultrasound with the patient.   I have answered questions to her satisfaction and total of 20 minutes spend today

## 2025-03-19 ENCOUNTER — PATIENT MESSAGE (OUTPATIENT)
Dept: OTHER | Facility: OTHER | Age: 40
End: 2025-03-19
Payer: MEDICAID

## 2025-03-24 ENCOUNTER — HOSPITAL ENCOUNTER (OUTPATIENT)
Dept: RADIOLOGY | Facility: HOSPITAL | Age: 40
Discharge: HOME | End: 2025-03-24
Attending: OBSTETRICS & GYNECOLOGY
Payer: MEDICAID

## 2025-03-24 DIAGNOSIS — O09.529 ANTEPARTUM MULTIGRAVIDA OF ADVANCED MATERNAL AGE: ICD-10-CM

## 2025-03-24 PROCEDURE — 76816 OB US FOLLOW-UP PER FETUS: CPT | Mod: TC,PN

## 2025-03-24 PROCEDURE — 76816 OB US FOLLOW-UP PER FETUS: CPT | Mod: 26,,, | Performed by: RADIOLOGY

## 2025-03-25 ENCOUNTER — PATIENT MESSAGE (OUTPATIENT)
Dept: MATERNAL FETAL MEDICINE | Facility: CLINIC | Age: 40
End: 2025-03-25
Payer: MEDICAID

## 2025-03-25 ENCOUNTER — ROUTINE PRENATAL (OUTPATIENT)
Dept: OBSTETRICS AND GYNECOLOGY | Facility: CLINIC | Age: 40
End: 2025-03-25
Payer: MEDICAID

## 2025-03-25 ENCOUNTER — RESULTS FOLLOW-UP (OUTPATIENT)
Dept: OBSTETRICS AND GYNECOLOGY | Facility: CLINIC | Age: 40
End: 2025-03-25

## 2025-03-25 VITALS
WEIGHT: 169.06 LBS | DIASTOLIC BLOOD PRESSURE: 78 MMHG | BODY MASS INDEX: 29.02 KG/M2 | SYSTOLIC BLOOD PRESSURE: 118 MMHG

## 2025-03-25 DIAGNOSIS — O28.3 ABNORMAL FETAL ULTRASOUND: ICD-10-CM

## 2025-03-25 DIAGNOSIS — O36.63X0 MACROSOMIA OF FETUS AFFECTING MANAGEMENT OF MOTHER IN THIRD TRIMESTER, SINGLE OR UNSPECIFIED FETUS: Primary | ICD-10-CM

## 2025-03-25 DIAGNOSIS — O40.9XX0 POLYHYDRAMNIOS AFFECTING PREGNANCY: ICD-10-CM

## 2025-03-25 DIAGNOSIS — O09.91 ENCOUNTER FOR SUPERVISION OF HIGH RISK PREGNANCY IN FIRST TRIMESTER, ANTEPARTUM: Primary | ICD-10-CM

## 2025-03-25 DIAGNOSIS — O36.61X0 MACROSOMIA OF FETUS AFFECTING MANAGEMENT OF MOTHER IN FIRST TRIMESTER, SINGLE OR UNSPECIFIED FETUS: ICD-10-CM

## 2025-03-25 DIAGNOSIS — O09.529 ANTEPARTUM MULTIGRAVIDA OF ADVANCED MATERNAL AGE: ICD-10-CM

## 2025-03-25 DIAGNOSIS — O34.211 MATERNAL CARE DUE TO LOW TRANSVERSE UTERINE SCAR FROM PREVIOUS CESAREAN DELIVERY: ICD-10-CM

## 2025-03-25 PROCEDURE — 87653 STREP B DNA AMP PROBE: CPT | Performed by: OBSTETRICS & GYNECOLOGY

## 2025-03-25 PROCEDURE — 99999 PR PBB SHADOW E&M-EST. PATIENT-LVL III: CPT | Mod: PBBFAC,,, | Performed by: OBSTETRICS & GYNECOLOGY

## 2025-03-25 PROCEDURE — 99999PBSHW POCT URINALYSIS(INSTRUMENT): Mod: PBBFAC,,,

## 2025-03-25 PROCEDURE — 99214 OFFICE O/P EST MOD 30 MIN: CPT | Mod: TH,S$PBB,, | Performed by: OBSTETRICS & GYNECOLOGY

## 2025-03-25 PROCEDURE — 99213 OFFICE O/P EST LOW 20 MIN: CPT | Mod: PBBFAC,TH,PN | Performed by: OBSTETRICS & GYNECOLOGY

## 2025-03-25 PROCEDURE — 81003 URINALYSIS AUTO W/O SCOPE: CPT | Mod: PBBFAC,PN | Performed by: OBSTETRICS & GYNECOLOGY

## 2025-03-25 NOTE — PROGRESS NOTES
Subjective:      Silvia Berry is a 39 y.o. female being seen today for her obstetrical visit. She is at 35w6d gestation. Patient reports no complaints.     OB ROS: no vaginal bleeding, no leakage of fluid, no contractions/cramping, no vaginal discharge. Fetal movement: normal.    Menstrual History:  OB History          2    Para   1    Term   1       0    AB   0    Living   1         SAB   0    IAB   0    Ectopic   0    Multiple   0    Live Births   1                Patient's last menstrual period was 2024 (exact date).       Objective:      /78   Wt 76.7 kg (169 lb 1.5 oz)   LMP 2024 (Exact Date)   BMI 29.02 kg/m²     Vitals  BP: 118/78  Weight: 76.7 kg (169 lb 1.5 oz)  Prenatal  Fetal Heart Rate: 138  Movement: Present  Dilation/Effacement/Station  Dilation: Closed  Fundal height size greater than dates  13.5 kg (29 lb 12.2 oz)           Prenatal Labs:  Lab Results   Component Value Date    GROUPTRH A NEG 2024    INDCOGEL POS 2021    HGB 11.8 (L) 2025    HCT 37.3 2025     2025    RUBELLAIMMUN Reactive 2024    HEPBSAG Non-reactive 2024    [id number] Non-reactive 2024    RPR Non-reactive 2021    LABCHLA Not Detected 2024    LABNGO Not Detected 2024    LABURIN No significant growth 2024    OBGLUCOSESCR 120 2025    [id number] Negative 2021       Assessment:      Pregnancy 35w6d      Plan:     EDC 2025 by 9wk ultrasound  Vaccines: declined flu, Tdap, & RSV  BMI: 23, recommenced 25-35# gain    Cdx1; interested in trial of labor after  delivery. Aware we cannot promise a .   Polyhydramnios: TITO 24.4, repeat ultrasound weekly. Offered 3hr GTT, declined.   Suspected macrosomia: Discussed macrosomia. Discussed limitations of ultrasound in the third trimester. Discussed risk of shoulder dystocia, vaginal laceration, fetal injury or even death. Offered repeat 3hr GTT,  patient declined. Recommend [facility] referral for repeat Growth US. Advised if suspected greated that 5000g at birth, I recommend a  delivery. Discussed complexity of macrosomia, polyhydramnios, and history of prior  delivery. I recommended a repeat  delivery, patient desires a trial of labor after  delivery. Discussed induction of labor with pit or mechanical ripening balloon, she is currently closed.   Desires Natural, recommended Lamaze classes & . Discussed risk of needed general anesthesia or spinal at time of delivery. Discussed placenta policy    Genetic: AMA: NIPT low risk, female, aspirin   Pap: 2023 NILM/HPVneg  Rh Neg: [place] Rh testing; RhD not detected, RhoGam not indicated.   Peds: [facility]  Natural, Delayed cord clamping  Breastfeeding, has pump  Contraception: declines          1. Encounter for supervision of high risk pregnancy in first trimester, antepartum    2. Antepartum multigravida of advanced maternal age  -     POCT Urinalysis(Instrument)  -     Group B Streptococcus, PCR  -     Ambulatory referral/consult to Perinatology; Future; Expected date: 2025    3. Abnormal fetal ultrasound  -     Ambulatory referral/consult to Perinatology; Future; Expected date: 2025    4. Macrosomia of fetus affecting management of mother in first trimester, single or unspecified fetus  -     Ambulatory referral/consult to Perinatology; Future; Expected date: 2025    5. Maternal care due to low transverse uterine scar from previous  delivery  -     Ambulatory referral/consult to Perinatology; Future; Expected date: 2025    6. Polyhydramnios affecting pregnancy  -     Ambulatory referral/consult to Perinatology; Future; Expected date: 2025         Follow up in 1 weeks.       I reviewed today her blood pressure, weight gain, urine results and  fetal heart rate.  I have reviewed the previous labs and ultrasound with the patient.   I have answered  questions to her satisfaction and total of 20 minutes spend today

## 2025-03-28 LAB — GROUP B STREPTOCOCCUS, PCR (OHS): NEGATIVE

## 2025-03-31 ENCOUNTER — PROCEDURE VISIT (OUTPATIENT)
Dept: MATERNAL FETAL MEDICINE | Facility: CLINIC | Age: 40
End: 2025-03-31
Payer: MEDICAID

## 2025-03-31 DIAGNOSIS — O36.63X0 MACROSOMIA OF FETUS AFFECTING MANAGEMENT OF MOTHER IN THIRD TRIMESTER, SINGLE OR UNSPECIFIED FETUS: ICD-10-CM

## 2025-03-31 PROCEDURE — 76811 OB US DETAILED SNGL FETUS: CPT | Mod: PBBFAC,PN | Performed by: OBSTETRICS & GYNECOLOGY

## 2025-04-01 ENCOUNTER — RESULTS FOLLOW-UP (OUTPATIENT)
Dept: OBSTETRICS AND GYNECOLOGY | Facility: CLINIC | Age: 40
End: 2025-04-01

## 2025-04-01 ENCOUNTER — ROUTINE PRENATAL (OUTPATIENT)
Dept: OBSTETRICS AND GYNECOLOGY | Facility: CLINIC | Age: 40
End: 2025-04-01
Payer: MEDICAID

## 2025-04-01 VITALS
SYSTOLIC BLOOD PRESSURE: 124 MMHG | BODY MASS INDEX: 29.59 KG/M2 | DIASTOLIC BLOOD PRESSURE: 82 MMHG | WEIGHT: 172.38 LBS

## 2025-04-01 DIAGNOSIS — O28.3 ABNORMAL FETAL ULTRASOUND: ICD-10-CM

## 2025-04-01 DIAGNOSIS — O09.91 ENCOUNTER FOR SUPERVISION OF HIGH RISK PREGNANCY IN FIRST TRIMESTER, ANTEPARTUM: ICD-10-CM

## 2025-04-01 DIAGNOSIS — O09.529 ANTEPARTUM MULTIGRAVIDA OF ADVANCED MATERNAL AGE: Primary | ICD-10-CM

## 2025-04-01 DIAGNOSIS — O36.61X0 MACROSOMIA OF FETUS AFFECTING MANAGEMENT OF MOTHER IN FIRST TRIMESTER, SINGLE OR UNSPECIFIED FETUS: ICD-10-CM

## 2025-04-01 PROCEDURE — 81003 URINALYSIS AUTO W/O SCOPE: CPT | Mod: PBBFAC,PN | Performed by: OBSTETRICS & GYNECOLOGY

## 2025-04-01 PROCEDURE — 99999PBSHW POCT URINALYSIS(INSTRUMENT): Mod: PBBFAC,,,

## 2025-04-01 PROCEDURE — 99999 PR PBB SHADOW E&M-EST. PATIENT-LVL II: CPT | Mod: PBBFAC,,, | Performed by: OBSTETRICS & GYNECOLOGY

## 2025-04-01 PROCEDURE — 99214 OFFICE O/P EST MOD 30 MIN: CPT | Mod: TH,S$PBB,, | Performed by: OBSTETRICS & GYNECOLOGY

## 2025-04-01 PROCEDURE — 99212 OFFICE O/P EST SF 10 MIN: CPT | Mod: PBBFAC,TH,PN | Performed by: OBSTETRICS & GYNECOLOGY

## 2025-04-01 NOTE — PROGRESS NOTES
Subjective:      Silvia Berry is a 39 y.o. female being seen today for her obstetrical visit. She is at 36w6d gestation. Patient reports no complaints.     OB ROS: no vaginal bleeding, no leakage of fluid, no contractions/cramping, no vaginal discharge. Fetal movement: normal.    Menstrual History:  OB History          2    Para   1    Term   1       0    AB   0    Living   1         SAB   0    IAB   0    Ectopic   0    Multiple   0    Live Births   1                Patient's last menstrual period was 2024 (exact date).       Objective:      /82   Wt 78.2 kg (172 lb 6.4 oz)   LMP 2024 (Exact Date)   BMI 29.59 kg/m²     Vitals  BP: 124/82  Weight: 78.2 kg (172 lb 6.4 oz)  Prenatal  Fetal Heart Rate: 140  Movement: Present  Fundal height size greater than dates  15 kg (33 lb 1.1 oz)           Prenatal Labs:  Lab Results   Component Value Date    GROUPTRH A NEG 2024    INDCOGEL POS 2021    HGB 11.8 (L) 2025    HCT 37.3 2025     2025    RUBELLAIMMUN Reactive 2024    HEPBSAG Non-reactive 2024    [id number] Non-reactive 2024    RPR Non-reactive 2021    LABCHLA Not Detected 2024    LABNGO Not Detected 2024    LABURIN No significant growth 2024    OBGLUCOSESCR 120 2025    [id number] Negative 2021       Assessment:      Pregnancy 36w6d      Plan:     EDC 2025 by 9wk ultrasound  Vaccines: declined flu, Tdap, & RSV  BMI: 23, recommenced 25-35# gain     Cdx1; interested in trial of labor after  delivery. Aware we cannot promise a .   Polyhydramnios: TITO 30, repeat BPP ultrasound weekly. Offered 3hr GTT, declined. Delivery 39 0/7 - 40 6/7.   At last visit suspected macrosomia: MFM growth ultrasound with in normal limits.   Breech presentation on MFM ultrasound, cephalic last week. Dicussed virable presentation due to poly and risk of cord prolapse. Dicussed ECV and risks,  patient declined.   I recommended a repeat  delivery at 39 weeks due to breech presentation, repeat low transverse  delivery schedule for . Patient desires a trial of labor after  delivery if found to be cephalic at the time of delivery. Discussed induction vs augmentation of labor with pit or mechanical ripening balloon, she is currently closed.   Desires Natural, recommended Lamaze classes & . Discussed risk of needed general anesthesia or spinal at time of delivery. Discussed placenta policy     Genetic: AMA: NIPT low risk, female, aspirin   Pap: 2023 NILM/HPVneg  Rh Neg: [place] Rh testing; RhD not detected, RhoGam not indicated.   Peds: [facility]  Natural, Delayed cord clamping  Breastfeeding, has pump  Contraception: declines        1. Antepartum multigravida of advanced maternal age  -     POCT Urinalysis(Instrument)  -     US OB Limited with Biophysical Profile (xpd); Standing    2. Macrosomia of fetus affecting management of mother in first trimester, single or unspecified fetus  -     US OB Limited with Biophysical Profile (xpd); Standing    3. Encounter for supervision of high risk pregnancy in first trimester, antepartum  -     US OB Limited with Biophysical Profile (xpd); Standing    4. Abnormal fetal ultrasound  -     US OB Limited with Biophysical Profile (xpd); Standing         Follow up in 1 weeks.       I reviewed today her blood pressure, weight gain, urine results and  fetal heart rate.  I have reviewed the previous labs and ultrasound with the patient.   I have answered questions to her satisfaction and total of 20 minutes spend today

## 2025-04-08 ENCOUNTER — HOSPITAL ENCOUNTER (OUTPATIENT)
Dept: RADIOLOGY | Facility: HOSPITAL | Age: 40
Discharge: HOME | End: 2025-04-08
Attending: OBSTETRICS & GYNECOLOGY
Payer: MEDICAID

## 2025-04-08 ENCOUNTER — ROUTINE PRENATAL (OUTPATIENT)
Dept: OBSTETRICS AND GYNECOLOGY | Facility: CLINIC | Age: 40
End: 2025-04-08
Payer: MEDICAID

## 2025-04-08 ENCOUNTER — RESULTS FOLLOW-UP (OUTPATIENT)
Dept: OBSTETRICS AND GYNECOLOGY | Facility: CLINIC | Age: 40
End: 2025-04-08

## 2025-04-08 VITALS
WEIGHT: 172.81 LBS | BODY MASS INDEX: 29.67 KG/M2 | DIASTOLIC BLOOD PRESSURE: 76 MMHG | SYSTOLIC BLOOD PRESSURE: 118 MMHG

## 2025-04-08 DIAGNOSIS — O09.91 ENCOUNTER FOR SUPERVISION OF HIGH RISK PREGNANCY IN FIRST TRIMESTER, ANTEPARTUM: ICD-10-CM

## 2025-04-08 DIAGNOSIS — O09.529 ANTEPARTUM MULTIGRAVIDA OF ADVANCED MATERNAL AGE: ICD-10-CM

## 2025-04-08 DIAGNOSIS — O36.61X0 MACROSOMIA OF FETUS AFFECTING MANAGEMENT OF MOTHER IN FIRST TRIMESTER, SINGLE OR UNSPECIFIED FETUS: ICD-10-CM

## 2025-04-08 DIAGNOSIS — O28.3 ABNORMAL FETAL ULTRASOUND: ICD-10-CM

## 2025-04-08 DIAGNOSIS — O09.91 ENCOUNTER FOR SUPERVISION OF HIGH RISK PREGNANCY IN FIRST TRIMESTER, ANTEPARTUM: Primary | ICD-10-CM

## 2025-04-08 PROCEDURE — 99214 OFFICE O/P EST MOD 30 MIN: CPT | Mod: TH,S$PBB,, | Performed by: OBSTETRICS & GYNECOLOGY

## 2025-04-08 PROCEDURE — 99213 OFFICE O/P EST LOW 20 MIN: CPT | Mod: PBBFAC,25,TH,PN | Performed by: OBSTETRICS & GYNECOLOGY

## 2025-04-08 PROCEDURE — 99999 PR PBB SHADOW E&M-EST. PATIENT-LVL III: CPT | Mod: PBBFAC,,, | Performed by: OBSTETRICS & GYNECOLOGY

## 2025-04-08 PROCEDURE — 99999PBSHW POCT URINALYSIS(INSTRUMENT): Mod: PBBFAC,,,

## 2025-04-08 PROCEDURE — 76819 FETAL BIOPHYS PROFIL W/O NST: CPT | Mod: TC,PN

## 2025-04-08 PROCEDURE — 76815 OB US LIMITED FETUS(S): CPT | Mod: 26,,, | Performed by: RADIOLOGY

## 2025-04-08 PROCEDURE — 81003 URINALYSIS AUTO W/O SCOPE: CPT | Mod: PBBFAC,PN | Performed by: OBSTETRICS & GYNECOLOGY

## 2025-04-08 PROCEDURE — 76819 FETAL BIOPHYS PROFIL W/O NST: CPT | Mod: 26,,, | Performed by: RADIOLOGY

## 2025-04-08 NOTE — PROGRESS NOTES
Subjective:      Silvia Berry is a 39 y.o. female being seen today for her obstetrical visit. She is at 37w6d gestation. Patient reports no complaints.     OB ROS: no vaginal bleeding, no leakage of fluid, no contractions/cramping, no vaginal discharge. Fetal movement: normal.    Menstrual History:  OB History          2    Para   1    Term   1       0    AB   0    Living   1         SAB   0    IAB   0    Ectopic   0    Multiple   0    Live Births   1                Patient's last menstrual period was 2024 (exact date).       Objective:      /76   Wt 78.4 kg (172 lb 13.5 oz)   LMP 2024 (Exact Date)   BMI 29.67 kg/m²     Vitals  BP: 118/76  Weight: 78.4 kg (172 lb 13.5 oz)  Prenatal  Fetal Heart Rate: 144 US  Movement: Present  Fundal height size equal to dates  15.2 kg (33 lb 8.2 oz)           Prenatal Labs:  Lab Results   Component Value Date    GROUPTRH A NEG 2024    INDCOGEL POS 2021    HGB 11.8 (L) 2025    HCT 37.3 2025     2025    RUBELLAIMMUN Reactive 2024    HEPBSAG Non-reactive 2024    [id number] Non-reactive 2024    RPR Non-reactive 2021    LABCHLA Not Detected 2024    LABNGO Not Detected 2024    LABURIN No significant growth 2024    OBGLUCOSESCR 120 2025    [id number] Negative 2021       Assessment:      Pregnancy 37w6d      Plan:     EDC 2025 by 9wk ultrasound  GBS neg  Vaccines: declined flu, Tdap, & RSV  BMI: 23, recommenced 25-35# gain    Cdx1; interested in trial of labor after  delivery. Aware we cannot promise a .   Polyhydramnios: TITO 30> today 24, repeat BPP . Delivery Planned 39 0/7 - 40 6/7.   At prior visit suspected macrosomia: MFM growth ultrasound with in normal limits.   Breech presentation on MFM ultrasound, However, cephalic today.  Dicussed virable presentation due to poly and risk of cord prolapse.   Repeat low transverse   delivery schedule for , if baby in breech presentation. However, if the baby remains cephalic she desires a trial of labor after  delivery.  Discussed induction vs augmentation of labor with pit or mechanical ripening balloon, she is currently closed.   Desires Natural, recommended Lamaze classes & . Discussed risk of needed general anesthesia or spinal at time of delivery. Discussed placenta policy    Genetic: AMA: NIPT low risk, female, aspirin   Pap: 2023 NILM/HPVneg  Rh Neg: [place] Rh testing; RhD not detected, RhoGam not indicated.   Peds: [facility]  Natural, Delayed cord clamping  Breastfeeding, has pump  Contraception: declines  Discussed placenta policy        1. Encounter for supervision of high risk pregnancy in first trimester, antepartum  -     POCT Urinalysis(Instrument)         Follow up in 1 weeks.       I reviewed today her blood pressure, weight gain, urine results and  fetal heart rate.  I have reviewed the previous labs and ultrasound with the patient.   I have answered questions to her satisfaction and total of 20 minutes spend today

## 2025-04-15 ENCOUNTER — HOSPITAL ENCOUNTER (OUTPATIENT)
Dept: RADIOLOGY | Facility: HOSPITAL | Age: 40
Discharge: HOME | End: 2025-04-15
Attending: OBSTETRICS & GYNECOLOGY
Payer: MEDICAID

## 2025-04-15 ENCOUNTER — ROUTINE PRENATAL (OUTPATIENT)
Dept: OBSTETRICS AND GYNECOLOGY | Facility: CLINIC | Age: 40
End: 2025-04-15
Payer: MEDICAID

## 2025-04-15 ENCOUNTER — RESULTS FOLLOW-UP (OUTPATIENT)
Dept: OBSTETRICS AND GYNECOLOGY | Facility: CLINIC | Age: 40
End: 2025-04-15

## 2025-04-15 VITALS
BODY MASS INDEX: 29.55 KG/M2 | SYSTOLIC BLOOD PRESSURE: 114 MMHG | DIASTOLIC BLOOD PRESSURE: 70 MMHG | WEIGHT: 172.19 LBS

## 2025-04-15 DIAGNOSIS — O09.529 ANTEPARTUM MULTIGRAVIDA OF ADVANCED MATERNAL AGE: ICD-10-CM

## 2025-04-15 DIAGNOSIS — Z3A.38 38 WEEKS GESTATION OF PREGNANCY: ICD-10-CM

## 2025-04-15 DIAGNOSIS — O36.61X0 MACROSOMIA OF FETUS AFFECTING MANAGEMENT OF MOTHER IN FIRST TRIMESTER, SINGLE OR UNSPECIFIED FETUS: ICD-10-CM

## 2025-04-15 DIAGNOSIS — O34.211 MATERNAL CARE DUE TO LOW TRANSVERSE UTERINE SCAR FROM PREVIOUS CESAREAN DELIVERY: ICD-10-CM

## 2025-04-15 DIAGNOSIS — O09.91 ENCOUNTER FOR SUPERVISION OF HIGH RISK PREGNANCY IN FIRST TRIMESTER, ANTEPARTUM: ICD-10-CM

## 2025-04-15 DIAGNOSIS — O09.91 ENCOUNTER FOR SUPERVISION OF HIGH RISK PREGNANCY IN FIRST TRIMESTER, ANTEPARTUM: Primary | ICD-10-CM

## 2025-04-15 DIAGNOSIS — O28.3 ABNORMAL FETAL ULTRASOUND: ICD-10-CM

## 2025-04-15 PROCEDURE — 81003 URINALYSIS AUTO W/O SCOPE: CPT | Mod: PBBFAC,PN | Performed by: OBSTETRICS & GYNECOLOGY

## 2025-04-15 PROCEDURE — 76815 OB US LIMITED FETUS(S): CPT | Mod: 26,,, | Performed by: RADIOLOGY

## 2025-04-15 PROCEDURE — 99214 OFFICE O/P EST MOD 30 MIN: CPT | Mod: TH,S$PBB,, | Performed by: OBSTETRICS & GYNECOLOGY

## 2025-04-15 PROCEDURE — 76819 FETAL BIOPHYS PROFIL W/O NST: CPT | Mod: 26,,, | Performed by: RADIOLOGY

## 2025-04-15 PROCEDURE — 99999 PR PBB SHADOW E&M-EST. PATIENT-LVL II: CPT | Mod: PBBFAC,,, | Performed by: OBSTETRICS & GYNECOLOGY

## 2025-04-15 PROCEDURE — 76815 OB US LIMITED FETUS(S): CPT | Mod: TC,PN

## 2025-04-15 PROCEDURE — 99212 OFFICE O/P EST SF 10 MIN: CPT | Mod: PBBFAC,25,TH,PN | Performed by: OBSTETRICS & GYNECOLOGY

## 2025-04-15 PROCEDURE — 99999PBSHW POCT URINALYSIS(INSTRUMENT): Mod: PBBFAC,,,

## 2025-04-15 NOTE — PROGRESS NOTES
Subjective:      Silvia Berry is a 39 y.o. female being seen today for her obstetrical visit. She is at 38w6d gestation. Patient reports no complaints.     OB ROS: no vaginal bleeding, no leakage of fluid, no contractions/cramping, no vaginal discharge. Fetal movement: normal.    Menstrual History:  OB History          2    Para   1    Term   1       0    AB   0    Living   1         SAB   0    IAB   0    Ectopic   0    Multiple   0    Live Births   1                Patient's last menstrual period was 2024 (exact date).       Objective:      /70   Wt 78.1 kg (172 lb 2.9 oz)   LMP 2024 (Exact Date)   BMI 29.55 kg/m²     Vitals  BP: 114/70  Weight: 78.1 kg (172 lb 2.9 oz)  Prenatal  Fetal Heart Rate: 127 u/s  Movement: Present  Dilation/Effacement/Station  Dilation: Closed  Effacement (%): 60  Station: -2 (medium/anterior)  Fundal height size equal to dates  14.9 kg (32 lb 13.6 oz)           Prenatal Labs:  Lab Results   Component Value Date    GROUPTRH A NEG 2024    INDCOGEL POS 2021    HGB 11.8 (L) 2025    HCT 37.3 2025     2025    RUBELLAIMMUN Reactive 2024    HEPBSAG Non-reactive 2024    [id number] Non-reactive 2024    RPR Non-reactive 2021    LABCHLA Not Detected 2024    LABNGO Not Detected 2024    LABURIN No significant growth 2024    OBGLUCOSESCR 120 2025    [id number] Negative 2021       Assessment:      Pregnancy 38w6d      Plan:     EDC 2025 by 9wk ultrasound  GBS neg  Vaccines: declined flu, Tdap, & RSV  BMI: 23, recommenced 25-35# gain  Cdx1; interested in trial of labor after  delivery. Aware we cannot promise a .   Desires Natural, recommended Lamaze classes &   Genetic: AMA: NIPT low risk, female, aspirin   Pap: 2023 NILM/HPVneg  Rh Neg: [place] Rh testing; RhD not detected, RhoGam not indicated.   Peds: [facility]  Natural, Delayed cord  clamping  Breastfeeding, has pump  Contraception: declines  Discussed placenta policy        1. Encounter for supervision of high risk pregnancy in first trimester, antepartum    2. Antepartum multigravida of advanced maternal age    3. Maternal care due to low transverse uterine scar from previous  delivery         Follow up in 1 weeks.       I reviewed today her blood pressure, weight gain, urine results and  fetal heart rate.  I have reviewed the previous labs and ultrasound with the patient.   I have answered questions to her satisfaction and total of 20 minutes spend today

## 2025-04-21 ENCOUNTER — RESULTS FOLLOW-UP (OUTPATIENT)
Dept: OBSTETRICS AND GYNECOLOGY | Facility: CLINIC | Age: 40
End: 2025-04-21

## 2025-04-21 ENCOUNTER — ROUTINE PRENATAL (OUTPATIENT)
Dept: OBSTETRICS AND GYNECOLOGY | Facility: CLINIC | Age: 40
End: 2025-04-21
Payer: MEDICAID

## 2025-04-21 ENCOUNTER — HOSPITAL ENCOUNTER (OUTPATIENT)
Dept: RADIOLOGY | Facility: HOSPITAL | Age: 40
Discharge: HOME | End: 2025-04-21
Attending: OBSTETRICS & GYNECOLOGY
Payer: MEDICAID

## 2025-04-21 VITALS
SYSTOLIC BLOOD PRESSURE: 132 MMHG | DIASTOLIC BLOOD PRESSURE: 82 MMHG | BODY MASS INDEX: 29.59 KG/M2 | WEIGHT: 172.38 LBS

## 2025-04-21 DIAGNOSIS — O09.91 ENCOUNTER FOR SUPERVISION OF HIGH RISK PREGNANCY IN FIRST TRIMESTER, ANTEPARTUM: ICD-10-CM

## 2025-04-21 DIAGNOSIS — O09.529 ANTEPARTUM MULTIGRAVIDA OF ADVANCED MATERNAL AGE: ICD-10-CM

## 2025-04-21 DIAGNOSIS — O28.3 ABNORMAL FETAL ULTRASOUND: ICD-10-CM

## 2025-04-21 DIAGNOSIS — O36.61X0 MACROSOMIA OF FETUS AFFECTING MANAGEMENT OF MOTHER IN FIRST TRIMESTER, SINGLE OR UNSPECIFIED FETUS: ICD-10-CM

## 2025-04-21 DIAGNOSIS — O34.211 MATERNAL CARE DUE TO LOW TRANSVERSE UTERINE SCAR FROM PREVIOUS CESAREAN DELIVERY: ICD-10-CM

## 2025-04-21 DIAGNOSIS — Z3A.39 39 WEEKS GESTATION OF PREGNANCY: ICD-10-CM

## 2025-04-21 DIAGNOSIS — O09.91 ENCOUNTER FOR SUPERVISION OF HIGH RISK PREGNANCY IN FIRST TRIMESTER, ANTEPARTUM: Primary | ICD-10-CM

## 2025-04-21 PROCEDURE — 76815 OB US LIMITED FETUS(S): CPT | Mod: TC,PN

## 2025-04-21 PROCEDURE — 76819 FETAL BIOPHYS PROFIL W/O NST: CPT | Mod: 26,,, | Performed by: RADIOLOGY

## 2025-04-21 PROCEDURE — 99212 OFFICE O/P EST SF 10 MIN: CPT | Mod: PBBFAC,25,TH,PN | Performed by: OBSTETRICS & GYNECOLOGY

## 2025-04-21 PROCEDURE — 99999PBSHW POCT URINALYSIS(INSTRUMENT): Mod: PBBFAC,,,

## 2025-04-21 PROCEDURE — 76815 OB US LIMITED FETUS(S): CPT | Mod: 26,,, | Performed by: RADIOLOGY

## 2025-04-21 PROCEDURE — 99999 PR PBB SHADOW E&M-EST. PATIENT-LVL II: CPT | Mod: PBBFAC,,, | Performed by: OBSTETRICS & GYNECOLOGY

## 2025-04-21 PROCEDURE — 99214 OFFICE O/P EST MOD 30 MIN: CPT | Mod: TH,S$PBB,, | Performed by: OBSTETRICS & GYNECOLOGY

## 2025-04-21 PROCEDURE — 81003 URINALYSIS AUTO W/O SCOPE: CPT | Mod: PBBFAC,PN | Performed by: OBSTETRICS & GYNECOLOGY

## 2025-04-21 NOTE — PROGRESS NOTES
Subjective:      Silvia Berry is a 39 y.o. female being seen today for her obstetrical visit. She is at 39w5d gestation. Patient reports no complaints.     OB ROS: no vaginal bleeding, no leakage of fluid, no contractions/cramping, no vaginal discharge. Fetal movement: normal.    Menstrual History:  OB History          2    Para   1    Term   1       0    AB   0    Living   1         SAB   0    IAB   0    Ectopic   0    Multiple   0    Live Births   1                Patient's last menstrual period was 2024 (exact date).       Objective:      /82   Wt 78.2 kg (172 lb 6.4 oz)   LMP 2024 (Exact Date)   BMI 29.59 kg/m²     Vitals  BP: 132/82  Weight: 78.2 kg (172 lb 6.4 oz)  Prenatal  Fetal Heart Rate: 129  Movement: Present  Dilation/Effacement/Station  Dilation: Closed  Effacement (%): 60  Station: -2  Fundal height size equal to dates  15 kg (33 lb 1.1 oz)           Prenatal Labs:  Lab Results   Component Value Date    GROUPTRH A NEG 2024    INDCOGEL POS 2021    HGB 11.8 (L) 2025    HCT 37.3 2025     2025    RUBELLAIMMUN Reactive 2024    HEPBSAG Non-reactive 2024    [id number] Non-reactive 2024    RPR Non-reactive 2021    LABCHLA Not Detected 2024    LABNGO Not Detected 2024    LABURIN No significant growth 2024    OBGLUCOSESCR 120 2025    [id number] Negative 2021       Assessment:      Pregnancy 39w5d      Plan:     EDC 2025 by 9wk ultrasound  GBS neg  Vaccines: declined flu, Tdap, & RSV  BMI: 23, recommenced 25-35# gain  Cdx1; interested in trial of labor after  delivery. Aware we cannot promise a .   Desires Natural, recommended Lamaze classes &   Genetic: AMA: NIPT low risk, female, aspirin   Pap: 2023 NILM/HPVneg  Rh Neg: [place] Rh testing; RhD not detected, RhoGam not indicated.   Peds: [facility]  Natural, Delayed cord clamping  Breastfeeding, has  pump  Contraception: declines  Discussed placenta policy  Declined 39 weeks induction of labor.         1. Encounter for supervision of high risk pregnancy in first trimester, antepartum    2. Antepartum multigravida of advanced maternal age    3. Maternal care due to low transverse uterine scar from previous  delivery         Follow up in 1 weeks.       I reviewed today her blood pressure, weight gain, urine results and  fetal heart rate.  I have reviewed the previous labs and ultrasound with the patient.   I have answered questions to her satisfaction and total of 20 minutes spend today

## 2025-04-22 ENCOUNTER — PATIENT MESSAGE (OUTPATIENT)
Dept: OBSTETRICS AND GYNECOLOGY | Facility: CLINIC | Age: 40
End: 2025-04-22
Payer: MEDICAID

## 2025-04-25 ENCOUNTER — ROUTINE PRENATAL (OUTPATIENT)
Dept: OBSTETRICS AND GYNECOLOGY | Facility: CLINIC | Age: 40
End: 2025-04-25
Payer: MEDICAID

## 2025-04-25 ENCOUNTER — RESULTS FOLLOW-UP (OUTPATIENT)
Dept: OBSTETRICS AND GYNECOLOGY | Facility: CLINIC | Age: 40
End: 2025-04-25

## 2025-04-25 VITALS — WEIGHT: 171.5 LBS | DIASTOLIC BLOOD PRESSURE: 80 MMHG | BODY MASS INDEX: 29.44 KG/M2 | SYSTOLIC BLOOD PRESSURE: 122 MMHG

## 2025-04-25 DIAGNOSIS — Z3A.40 40 WEEKS GESTATION OF PREGNANCY: Primary | ICD-10-CM

## 2025-04-25 DIAGNOSIS — O09.529 ANTEPARTUM MULTIGRAVIDA OF ADVANCED MATERNAL AGE: ICD-10-CM

## 2025-04-25 LAB
BILIRUBIN, UA POC OHS: NEGATIVE
BLOOD, UA POC OHS: NEGATIVE
CLARITY, UA POC OHS: CLEAR
COLOR, UA POC OHS: YELLOW
GLUCOSE, UA POC OHS: NEGATIVE
KETONES, UA POC OHS: NEGATIVE
LEUKOCYTES, UA POC OHS: NEGATIVE
NITRITE, UA POC OHS: NEGATIVE
PH, UA POC OHS: 6.5
PROTEIN, UA POC OHS: NEGATIVE
SPECIFIC GRAVITY, UA POC OHS: <=1.005
UROBILINOGEN, UA POC OHS: 0.2

## 2025-04-25 PROCEDURE — 99999 PR PBB SHADOW E&M-EST. PATIENT-LVL III: CPT | Mod: PBBFAC,,, | Performed by: OBSTETRICS & GYNECOLOGY

## 2025-04-25 PROCEDURE — 99213 OFFICE O/P EST LOW 20 MIN: CPT | Mod: PBBFAC,TH,PN | Performed by: OBSTETRICS & GYNECOLOGY

## 2025-04-25 NOTE — PROGRESS NOTES
Subjective:      Silvia Berry is a 39 y.o. female being seen today for her obstetrical visit. She is at 40w2d gestation. Patient reports no complaints.     OB ROS: no vaginal bleeding, no leakage of fluid, no contractions/cramping, no vaginal discharge. Fetal movement: normal.    Menstrual History:  OB History          2    Para   1    Term   1       0    AB   0    Living   1         SAB   0    IAB   0    Ectopic   0    Multiple   0    Live Births   1                Patient's last menstrual period was 2024 (exact date).       Objective:      /80   Wt 77.8 kg (171 lb 8.3 oz)   LMP 2024 (Exact Date)   BMI 29.44 kg/m²     Vitals  BP: 122/80  Weight: 77.8 kg (171 lb 8.3 oz)  Prenatal  Fetal Heart Rate: 140  Movement: Present  Dilation/Effacement/Station  Dilation: Closed  Effacement (%): 70  Station: -2  Fundal height size equal to dates  14.6 kg (32 lb 3 oz)           Prenatal Labs:  Lab Results   Component Value Date    GROUPTRH A NEG 2024    INDCOGEL POS 2021    HGB 11.8 (L) 2025    HCT 37.3 2025     2025    RUBELLAIMMUN Reactive 2024    HEPBSAG Non-reactive 2024    [id number] Non-reactive 2024    RPR Non-reactive 2021    LABCHLA Not Detected 2024    LABNGO Not Detected 2024    LABURIN No significant growth 2024    OBGLUCOSESCR 120 2025    [id number] Negative 2021       Assessment:      Pregnancy 40w2d      Plan:     EDC 2025 by 9wk ultrasound  GBS neg  Vaccines: declined flu, Tdap, & RSV  BMI: 23, recommenced 25-35# gain  Cdx1; interested in trial of labor after  delivery. Aware we cannot promise a .   Desires Natural, recommended Lamaze classes &   Genetic: AMA: NIPT low risk, female, aspirin   Pap: 2023 NILM/HPVneg  Rh Neg: [place] Rh testing; RhD not detected, RhoGam not indicated.   Peds: [facility]  Natural, Delayed cord clamping  Breastfeeding, has  pump  Contraception: declines  Discussed placenta policy    NST 40min strip, reactive and reassuring after buzzer.   No contractions  Cervix closed.     Plan for NST Monday and induction of labor Tue. 1. 40 weeks gestation of pregnancy  -     POCT Urinalysis(Instrument)         Follow up in 1 weeks.       I reviewed today her blood pressure, weight gain, urine results and  fetal heart rate.  I have reviewed the previous labs and ultrasound with the patient.   I have answered questions to her satisfaction and total of 20 minutes spend today

## 2025-04-28 ENCOUNTER — HOSPITAL ENCOUNTER (OUTPATIENT)
Dept: RADIOLOGY | Facility: HOSPITAL | Age: 40
Discharge: HOME | End: 2025-04-28
Attending: OBSTETRICS & GYNECOLOGY
Payer: MEDICAID

## 2025-04-28 ENCOUNTER — RESULTS FOLLOW-UP (OUTPATIENT)
Dept: OBSTETRICS AND GYNECOLOGY | Facility: CLINIC | Age: 40
End: 2025-04-28

## 2025-04-28 ENCOUNTER — ROUTINE PRENATAL (OUTPATIENT)
Dept: OBSTETRICS AND GYNECOLOGY | Facility: CLINIC | Age: 40
End: 2025-04-28
Payer: MEDICAID

## 2025-04-28 VITALS
WEIGHT: 172.81 LBS | DIASTOLIC BLOOD PRESSURE: 74 MMHG | SYSTOLIC BLOOD PRESSURE: 128 MMHG | BODY MASS INDEX: 29.67 KG/M2

## 2025-04-28 DIAGNOSIS — O34.211 MATERNAL CARE DUE TO LOW TRANSVERSE UTERINE SCAR FROM PREVIOUS CESAREAN DELIVERY: ICD-10-CM

## 2025-04-28 DIAGNOSIS — O40.9XX0 POLYHYDRAMNIOS AFFECTING PREGNANCY: ICD-10-CM

## 2025-04-28 DIAGNOSIS — O09.529 ANTEPARTUM MULTIGRAVIDA OF ADVANCED MATERNAL AGE: ICD-10-CM

## 2025-04-28 DIAGNOSIS — O36.61X0 MACROSOMIA OF FETUS AFFECTING MANAGEMENT OF MOTHER IN FIRST TRIMESTER, SINGLE OR UNSPECIFIED FETUS: ICD-10-CM

## 2025-04-28 DIAGNOSIS — O09.91 ENCOUNTER FOR SUPERVISION OF HIGH RISK PREGNANCY IN FIRST TRIMESTER, ANTEPARTUM: ICD-10-CM

## 2025-04-28 DIAGNOSIS — O09.91 ENCOUNTER FOR SUPERVISION OF HIGH RISK PREGNANCY IN FIRST TRIMESTER, ANTEPARTUM: Primary | ICD-10-CM

## 2025-04-28 DIAGNOSIS — O28.3 ABNORMAL FETAL ULTRASOUND: ICD-10-CM

## 2025-04-28 PROCEDURE — 76819 FETAL BIOPHYS PROFIL W/O NST: CPT | Mod: 26,,, | Performed by: RADIOLOGY

## 2025-04-28 PROCEDURE — 99999PBSHW POCT URINALYSIS(INSTRUMENT): Mod: PBBFAC,,,

## 2025-04-28 PROCEDURE — 76815 OB US LIMITED FETUS(S): CPT | Mod: TC,PN

## 2025-04-28 PROCEDURE — 81003 URINALYSIS AUTO W/O SCOPE: CPT | Mod: PBBFAC,PN | Performed by: OBSTETRICS & GYNECOLOGY

## 2025-04-28 PROCEDURE — 99212 OFFICE O/P EST SF 10 MIN: CPT | Mod: PBBFAC,25,TH,PN | Performed by: OBSTETRICS & GYNECOLOGY

## 2025-04-28 PROCEDURE — 59025 FETAL NON-STRESS TEST: CPT | Mod: PBBFAC,PN | Performed by: OBSTETRICS & GYNECOLOGY

## 2025-04-28 PROCEDURE — 99999 PR PBB SHADOW E&M-EST. PATIENT-LVL II: CPT | Mod: PBBFAC,,, | Performed by: OBSTETRICS & GYNECOLOGY

## 2025-04-28 PROCEDURE — 76815 OB US LIMITED FETUS(S): CPT | Mod: 26,,, | Performed by: RADIOLOGY

## 2025-04-28 NOTE — PROGRESS NOTES
Subjective:      Silvia Berry is a 39 y.o. female being seen today for her obstetrical visit. She is at 40w5d gestation. Patient reports no complaints.     OB ROS: no vaginal bleeding, no leakage of fluid, no contractions/cramping, no vaginal discharge. Fetal movement: normal.    Menstrual History:  OB History          2    Para   1    Term   1       0    AB   0    Living   1         SAB   0    IAB   0    Ectopic   0    Multiple   0    Live Births   1                Patient's last menstrual period was 2024 (exact date).       Objective:      /74   Wt 78.4 kg (172 lb 13.5 oz)   LMP 2024 (Exact Date)   BMI 29.67 kg/m²     Vitals  BP: 128/74  Weight: 78.4 kg (172 lb 13.5 oz)  Prenatal  Fetal Heart Rate: 125 U/S  Movement: Present  Dilation/Effacement/Station  Dilation: Closed  Effacement (%): 70  Station: -2 (soft, mid)  Fundal height size equal to dates  15.2 kg (33 lb 8.2 oz)           Prenatal Labs:  Lab Results   Component Value Date    GROUPTRH A NEG 2024    INDCOGEL POS 2021    HGB 11.8 (L) 2025    HCT 37.3 2025     2025    RUBELLAIMMUN Reactive 2024    HEPBSAG Non-reactive 2024    [id number] Non-reactive 2024    RPR Non-reactive 2021    LABCHLA Not Detected 2024    LABNGO Not Detected 2024    LABURIN No significant growth 2024    OBGLUCOSESCR 120 2025    [id number] Negative 2021       Assessment:      Pregnancy 40w5d      Plan:     EDC 2025 by 9wk ultrasound  GBS neg  Vaccines: declined flu, Tdap, & RSV  BMI: 23, recommenced 25-35# gain  Cdx1; interested in trial of labor after  delivery. Aware we cannot promise a .   Desires Natural, recommended Lamaze classes &   Genetic: AMA: NIPT low risk, female, aspirin   Pap: 2023 NILM/HPVneg  Rh Neg: [place] Rh testing; RhD not detected, RhoGam not indicated.   Peds: [facility]  Natural, Delayed cord  Continued Stay Note   [facility]     Patient Name: Speedy Villalba  MRN: [id number]  Today's Date: 11/3/2023    Admit Date: 10/30/2023    Plan: [facility]   Discharge Plan       Row Name 11/03/23 1559       Plan    Plan [facility]    Patient/Family in Agreement with Plan yes    Plan Comments [facility] can take pt on Saturday if pt is ready for d/c. Report number is [id number]. Pt does not need a covid swab. Will need orders before 1130 on the weekend.                   Discharge Codes    No documentation.                 Expected Discharge Date and Time       Expected Discharge Date Expected Discharge Time    Nov 4, 2023               JOHN Jauregui     clamping  Breastfeeding, has pump  Contraception: declines  Discussed placenta policy    BPP 10/10, NST reactive and reassuring, 20min strip, no CTX        1. Encounter for supervision of high risk pregnancy in first trimester, antepartum  -     POCT Urinalysis(Instrument)    2. Antepartum multigravida of advanced maternal age    3. Maternal care due to low transverse uterine scar from previous  delivery    4. Polyhydramnios affecting pregnancy         Follow up in 1 weeks.       I reviewed today her blood pressure, weight gain, urine results and  fetal heart rate.  I have reviewed the previous labs and ultrasound with the patient.   I have answered questions to her satisfaction and total of 20 minutes spend today

## 2025-04-30 PROBLEM — O34.219 VBAC, DELIVERED: Status: ACTIVE | Noted: 2025-04-30

## 2025-04-30 PROBLEM — Z34.90 ENCOUNTER FOR INDUCTION OF LABOR: Status: ACTIVE | Noted: 2025-04-30

## 2025-05-21 ENCOUNTER — PATIENT MESSAGE (OUTPATIENT)
Dept: RADIOLOGY | Facility: HOSPITAL | Age: 40
End: 2025-05-21
Payer: MEDICAID

## 2025-06-02 ENCOUNTER — TELEPHONE (OUTPATIENT)
Dept: OBSTETRICS AND GYNECOLOGY | Facility: CLINIC | Age: 40
End: 2025-06-02
Payer: MEDICAID

## 2025-06-04 ENCOUNTER — TELEPHONE (OUTPATIENT)
Dept: OBSTETRICS AND GYNECOLOGY | Facility: CLINIC | Age: 40
End: 2025-06-04
Payer: MEDICAID

## 2025-06-10 ENCOUNTER — PATIENT MESSAGE (OUTPATIENT)
Dept: OBSTETRICS AND GYNECOLOGY | Facility: CLINIC | Age: 40
End: 2025-06-10
Payer: MEDICAID

## 2025-06-11 ENCOUNTER — POSTPARTUM VISIT (OUTPATIENT)
Dept: OBSTETRICS AND GYNECOLOGY | Facility: CLINIC | Age: 40
End: 2025-06-11
Payer: MEDICAID

## 2025-06-11 VITALS
BODY MASS INDEX: 25.47 KG/M2 | WEIGHT: 148.38 LBS | DIASTOLIC BLOOD PRESSURE: 70 MMHG | SYSTOLIC BLOOD PRESSURE: 118 MMHG

## 2025-06-11 PROCEDURE — 99212 OFFICE O/P EST SF 10 MIN: CPT | Mod: PBBFAC,PN | Performed by: OBSTETRICS & GYNECOLOGY

## 2025-06-11 PROCEDURE — 99213 OFFICE O/P EST LOW 20 MIN: CPT | Mod: S$PBB,,, | Performed by: OBSTETRICS & GYNECOLOGY

## 2025-06-11 PROCEDURE — 99999 PR PBB SHADOW E&M-EST. PATIENT-LVL II: CPT | Mod: PBBFAC,,, | Performed by: OBSTETRICS & GYNECOLOGY

## 2025-06-11 NOTE — PROGRESS NOTES
Chief Complaint   Patient presents with    Postpartum Care       39 y.o. here for 6 week postpartum exam without complaint.   Admitted at 40.6wga for induction of labor.   She is s/p , 2025, 2nd degree laceration noted and repaired in normal fashion with 2-0 vicryl on CT.   EFW 4007g, 8#15oz  Information for the patient's :  January Berry [[id number]]     Apgars    Living status: Living  Apgar Component Scores:  1 min.:  5 min.:  10 min.:  15 min.:  20 min.:    Skin color:  1  1       Heart rate:  2  2       Reflex irritability:  2  2       Muscle tone:  2  2       Respiratory effort:  2  2       Total:  9  9       Apgars assigned by: DAVID RIOS RN     She has no mood complaints.   Minimal lochia.   No pain.  Breast feeding baby  Contraception: declined  Last Pap: 2023 NILM/HPVneg  Hemoglobin   Date Value Ref Range Status   2025 8.5 (L) 12.0 - 16.0 g/dL Final   2025 11.1 (L) 12.0 - 16.0 g/dL Final     Hematocrit   Date Value Ref Range Status   2025 25.9 (L) 37.0 - 48.5 % Final   2025 34.1 (L) 37.0 - 48.5 % Final       PE:  /70 (Patient Position: Sitting)   Wt 67.3 kg (148 lb 5.9 oz)   LMP 2024 (Exact Date)   Breastfeeding Yes   BMI 25.47 kg/m²   Body mass index is 25.47 kg/m².  Constitutional: She appears well-developed and well-nourished. No distress.   Abdominal: Soft. She exhibits no distension and no mass. There is no tenderness. There is no rebound and no guarding.   Genitourinary: Physical Exam    Genitourinary:    Vulva, urethra, bladder, vagina, uterus, right adnexa, left adnexa and urethral meatus normal.   The external female genitalia was normal.     Labial bartholins normal.There is no tenderness, lesion or injury on the right labia. There is no tenderness, lesion or injury on the left labia. Sutures intact.  Cervix is normal.       Psychiatric: She has a normal mood and affect.    Assessment:  6 week interval PP exam, doing  well    Plan:  Follow up Annual  Contraception: declined  Next pap 2027    I spent a total of 20 minutes on the day of the visit.This includes face to face time and non-face to face time preparing to see the patient (eg, review of tests), obtaining and/or reviewing separately obtained history, documenting clinical information in the electronic or other health record, independently interpreting results and communicating results to the patient/family/caregiver, or care coordinator.